# Patient Record
Sex: MALE | Race: OTHER | Employment: FULL TIME | URBAN - METROPOLITAN AREA
[De-identification: names, ages, dates, MRNs, and addresses within clinical notes are randomized per-mention and may not be internally consistent; named-entity substitution may affect disease eponyms.]

---

## 2021-07-14 ENCOUNTER — HOSPITAL ENCOUNTER (OUTPATIENT)
Age: 21
Discharge: HOME | End: 2021-07-14
Payer: OTHER MISCELLANEOUS

## 2021-07-14 VITALS
OXYGEN SATURATION: 100 % | TEMPERATURE: 98 F | SYSTOLIC BLOOD PRESSURE: 132 MMHG | DIASTOLIC BLOOD PRESSURE: 83 MMHG | RESPIRATION RATE: 20 BRPM | HEART RATE: 64 BPM

## 2021-07-14 DIAGNOSIS — S61.211A LACERATION OF LEFT INDEX FINGER WITHOUT FOREIGN BODY WITHOUT DAMAGE TO NAIL, INITIAL ENCOUNTER: Primary | ICD-10-CM

## 2021-07-14 PROCEDURE — 99203 OFFICE O/P NEW LOW 30 MIN: CPT | Performed by: PHYSICIAN ASSISTANT

## 2021-07-14 PROCEDURE — 12002 RPR S/N/AX/GEN/TRNK2.6-7.5CM: CPT | Performed by: PHYSICIAN ASSISTANT

## 2021-07-14 NOTE — ED PROVIDER NOTES
Patient Seen in: [facility] [address] [place] [address]      History   Patient presents with:  Laceration    Stated Complaint: LACERATION to the left hand finger    HPI/Subjective:   HPI    Work comp injury  Date of evaluation 7/14/2021  Date of current 7/14/2021 8 AM  E Full active range of motion. Normal cap refill.   Neuro: Cranial nerves intact, Normal Gait    ED Course   Labs Reviewed - No data to display                MDM          Tdap last updated 2013.    3 cm laceration to the lateral aspect of the left index fin

## 2021-07-14 NOTE — ED INITIAL ASSESSMENT (HPI)
W/C  0800 Pt was at work and cut his left 2nd digit on a metal window well. Bandage from home removed, bleeding controlled at this time. 3cm lac noted.     Pt unsure of last Tetanus

## 2021-07-16 ENCOUNTER — APPOINTMENT (OUTPATIENT)
Dept: OTHER | Facility: HOSPITAL | Age: 21
End: 2021-07-16
Attending: PREVENTIVE MEDICINE

## 2021-07-28 ENCOUNTER — APPOINTMENT (OUTPATIENT)
Dept: OTHER | Facility: HOSPITAL | Age: 21
End: 2021-07-28
Attending: PREVENTIVE MEDICINE